# Patient Record
Sex: FEMALE | Race: WHITE | URBAN - METROPOLITAN AREA
[De-identification: names, ages, dates, MRNs, and addresses within clinical notes are randomized per-mention and may not be internally consistent; named-entity substitution may affect disease eponyms.]

---

## 2017-02-02 ENCOUNTER — HOSPITAL ENCOUNTER (OUTPATIENT)
Dept: ULTRASOUND IMAGING | Age: 57
Discharge: HOME | End: 2017-02-02
Attending: FAMILY MEDICINE | Admitting: FAMILY MEDICINE

## 2017-02-02 DIAGNOSIS — R52 PAIN: ICD-10-CM

## 2017-02-02 DIAGNOSIS — M54.6 PAIN IN THORACIC SPINE: ICD-10-CM

## 2017-02-16 ENCOUNTER — HOSPITAL ENCOUNTER (OUTPATIENT)
Dept: NUCLEAR MEDICINE | Age: 57
Discharge: HOME | End: 2017-02-16
Attending: FAMILY MEDICINE | Admitting: FAMILY MEDICINE

## 2017-02-16 VITALS — WEIGHT: 145 LBS

## 2017-02-16 DIAGNOSIS — R10.11 RIGHT UPPER QUADRANT PAIN: ICD-10-CM

## 2017-02-16 DIAGNOSIS — R10.11 ABDOMINAL PAIN, RIGHT UPPER QUADRANT: ICD-10-CM

## 2017-02-16 RX ORDER — SODIUM CHLORIDE 9 MG/ML
INJECTION, SOLUTION INTRAVENOUS CONTINUOUS
Status: DISCONTINUED | OUTPATIENT
Start: 2017-02-16 | End: 2017-02-21 | Stop reason: HOSPADM

## 2017-02-16 RX ORDER — SODIUM CHLORIDE 0.9 % (FLUSH) 0.9 %
10 SYRINGE (ML) INJECTION PRN
Status: DISCONTINUED | OUTPATIENT
Start: 2017-02-16 | End: 2017-02-21 | Stop reason: HOSPADM

## 2017-02-16 RX ADMIN — Medication 10 ML: at 09:21

## 2017-02-16 RX ADMIN — Medication 4.5 MILLICURIE: at 09:21

## 2017-02-16 RX ADMIN — SODIUM CHLORIDE: 9 INJECTION, SOLUTION INTRAVENOUS at 10:04

## 2017-10-23 ENCOUNTER — HOSPITAL ENCOUNTER (OUTPATIENT)
Dept: OTHER | Age: 57
Discharge: HOME | End: 2017-10-23
Attending: FAMILY MEDICINE | Admitting: FAMILY MEDICINE

## 2017-10-23 DIAGNOSIS — R05.9 COUGH: ICD-10-CM

## 2017-11-22 ENCOUNTER — HOSPITAL ENCOUNTER (OUTPATIENT)
Dept: CT IMAGING | Age: 57
Discharge: HOME | End: 2017-11-22
Attending: FAMILY MEDICINE | Admitting: FAMILY MEDICINE

## 2017-11-22 DIAGNOSIS — R91.1 PULMONARY NODULE: ICD-10-CM

## 2017-12-14 ENCOUNTER — HOSPITAL ENCOUNTER (OUTPATIENT)
Dept: ENDOSCOPY | Age: 57
Discharge: HOME | End: 2017-12-14
Attending: INTERNAL MEDICINE | Admitting: INTERNAL MEDICINE

## 2017-12-14 VITALS
HEART RATE: 74 BPM | RESPIRATION RATE: 15 BRPM | WEIGHT: 143 LBS | TEMPERATURE: 98 F | BODY MASS INDEX: 25.34 KG/M2 | DIASTOLIC BLOOD PRESSURE: 71 MMHG | OXYGEN SATURATION: 100 % | SYSTOLIC BLOOD PRESSURE: 108 MMHG | HEIGHT: 63 IN

## 2017-12-14 LAB
GLUCOSE BLD-MCNC: 139 MG/DL (ref 70–99)
PERFORMED ON: ABNORMAL

## 2017-12-14 RX ORDER — TRAMADOL HYDROCHLORIDE 50 MG/1
50 TABLET ORAL PRN
COMMUNITY
Start: 2017-12-12

## 2017-12-14 RX ORDER — DAPAGLIFLOZIN AND METFORMIN HYDROCHLORIDE 10; 1000 MG/1; MG/1
1 TABLET, FILM COATED, EXTENDED RELEASE ORAL DAILY
COMMUNITY
Start: 2017-11-24

## 2017-12-14 RX ORDER — FLUTICASONE PROPIONATE 50 MCG
2 SPRAY, SUSPENSION (ML) NASAL PRN
Refills: 0 | COMMUNITY
Start: 2017-10-26

## 2017-12-14 RX ORDER — DULAGLUTIDE 0.75 MG/.5ML
0.75 INJECTION, SOLUTION SUBCUTANEOUS WEEKLY
Refills: 11 | COMMUNITY
Start: 2017-11-24

## 2017-12-14 RX ORDER — ATORVASTATIN CALCIUM 80 MG/1
80 TABLET, FILM COATED ORAL DAILY
COMMUNITY
Start: 2017-11-24

## 2017-12-14 RX ORDER — SODIUM CHLORIDE 9 MG/ML
INJECTION, SOLUTION INTRAVENOUS CONTINUOUS
Status: DISCONTINUED | OUTPATIENT
Start: 2017-12-14 | End: 2017-12-15 | Stop reason: HOSPADM

## 2017-12-14 RX ORDER — ZOLPIDEM TARTRATE 10 MG/1
10 TABLET ORAL PRN
COMMUNITY
Start: 2017-08-17

## 2017-12-14 RX ORDER — BUSPIRONE HYDROCHLORIDE 15 MG/1
15 TABLET ORAL DAILY
COMMUNITY
Start: 2017-11-24

## 2017-12-14 RX ORDER — LISINOPRIL AND HYDROCHLOROTHIAZIDE 25; 20 MG/1; MG/1
1 TABLET ORAL DAILY
COMMUNITY
Start: 2017-11-24

## 2017-12-14 RX ADMIN — SODIUM CHLORIDE: 9 INJECTION, SOLUTION INTRAVENOUS at 08:51

## 2017-12-14 ASSESSMENT — LIFESTYLE VARIABLES: SMOKING_STATUS: 1

## 2017-12-14 ASSESSMENT — COPD QUESTIONNAIRES: CAT_SEVERITY: MILD

## 2017-12-14 ASSESSMENT — PAIN - FUNCTIONAL ASSESSMENT: PAIN_FUNCTIONAL_ASSESSMENT: 0-10

## 2017-12-14 NOTE — H&P
colonoscopy with deep sedation  2. Procedure options, risks and benefits reviewed with patient. Patient expresses understanding.

## 2017-12-15 NOTE — PROCEDURES
[facility]                      [address] [facility], [address] [facility]                                  PROCEDURE NOTE    PATIENT NAME: Topher Haddad                   :        1960  MED REC NO:   [id number]                          ROOM:  ACCOUNT NO:   [de-identified]                          ADMIT DATE: 2017  PROVIDER:     Joey Fair MD    DATE OF PROCEDURE:  2017    PROCEDURE:  EGD, colonoscopy, and polypectomy report. SURGEON:  Joey Fair MD    INDICATION:  The patient is a 76-year-old female who presents with due to a  recent incidental finding on CT scan of the thickened mucosa in the mid  esophagus. The patient underwent a chest CT due to a finding of pulmonary  nodules on chest x-ray. The nodules were shown to be calcified granulomas  but the esophagus appeared thickened in the mid portion of the body. The  patient denies any heartburn or regurgitation. She does have occasional  belching. The patient also is due for a screening colonoscopy. She states  her last exam was a roughly 10 years ago and denies any history of polyps  or family history of colon cancer. Informed and written consent was  obtained from the patient after discussing risks of the procedure. MEDICATIONS USED:  Diprivan per Anesthesia. PROCEDURE DESCRIPTION:  With the patient in the left lateral decubitus  position, the upper video endoscope was advanced under direct  visualization. The esophagus was unremarkable down to distinct Z-line. The endoscope was passed to and fro through the esophagus several times and  no abnormalities were noted. Upon entering the stomach, a small pool of  clear fluid was noted. The gastric mucosa was unremarkable as were the  first and second portions of the duodenum. Retroflexed view in the stomach  was normal.  The endoscope was then withdrawn. The patient was rotated and a rectal exam was performed, which was  unremarkable. The colonoscope was introduced into the rectum and advanced  to the cecum where the ileocecal valve and appendiceal orifice were both  identified. The bowel preparation was good but the patient had severe  melanosis coli throughout. Upon slow withdrawal of the endoscope, the  patient had a total of three polyps measuring 4 to 6 mm in diameter and  removed using cold snare. One was in the ascending colon, one in the  transverse colon, and one in the sigmoid colon. The patient also was noted  to have mild scattered diverticula throughout the colon. Retroflexed view  in the rectum revealed small internal hemorrhoids. There were no  complications. ASSESSMENT AND PLAN:  1. Normal upper endoscopy. There were no mucosal abnormalities or any  other abnormality to explain the CT findings. I suspect it was just  artifactual.  2.  Three polyps removed. The patient will contact me in 5 days for the  results of biopsies. If they are all adenomas, I would recommend a repeat  colonoscopy in 3 years. 3.  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 4.  Mild pan-colonic diverticulosis. 5.  Small internal hemorrhoids. Thank you for asking me to see the patient.         Prachi Weir MD    D: 12/14/2017 10:47:44       T: 12/14/2017 10:53:56     [email or web address]  Job#: [id number]     Doc#: [id number]    CC:  Martha Jeong MD

## 2017-12-20 NOTE — ANESTHESIA POST-OP
Anesthesia Post-op Note    Patient: Karthik Dean  MRN: [id number]  YOB: 1960  Date of evaluation: 12/20/2017  Time:  5:07 PM     Procedure(s) Performed:     Last Vitals: /71   Pulse 74   Temp 98 °F (36.7 °C) (Temporal)   Resp 15   Ht 5' 3\" (1.6 m)   Wt 143 lb (64.9 kg)   LMP 12/01/2015   SpO2 100%   BMI 25.33 kg/m²     Carmen Phase I: Carmen Score: 10    Carmen Phase II: Carmen Score: 10    Anesthesia Post Evaluation    Final anesthesia type: MAC  Patient location during evaluation: PACU  Level of consciousness: awake  Airway patency: patent  Complications: no  Cardiovascular status: hemodynamically stable  Respiratory status: acceptable  Hydration status: euvolemic        Marry Carlisle MD  5:07 PM

## 2017-12-21 ENCOUNTER — HOSPITAL ENCOUNTER (OUTPATIENT)
Dept: OTHER | Age: 57
Discharge: HOME | End: 2017-12-21
Attending: FAMILY MEDICINE | Admitting: FAMILY MEDICINE

## 2017-12-21 LAB
BUN BLDV-MCNC: 14 MG/DL (ref 7–20)
CREAT SERPL-MCNC: 0.5 MG/DL (ref 0.6–1.1)
GFR AFRICAN AMERICAN: >60
GFR NON-AFRICAN AMERICAN: >60

## 2021-06-15 ENCOUNTER — CLINICAL DOCUMENTATION (OUTPATIENT)
Dept: OTHER | Age: 61
End: 2021-06-15